# Patient Record
Sex: FEMALE | Race: WHITE | URBAN - METROPOLITAN AREA
[De-identification: names, ages, dates, MRNs, and addresses within clinical notes are randomized per-mention and may not be internally consistent; named-entity substitution may affect disease eponyms.]

---

## 2024-05-28 LAB
CYTOLOGY CVX/VAG DOC THIN PREP: NORMAL
HPV16+18+45 E6+E7MRNA CVX NAA+PROBE: NEGATIVE

## 2024-07-03 ENCOUNTER — CLINICAL ABSTRACT (OUTPATIENT)
Dept: CARE COORDINATION | Age: 39
End: 2024-07-03

## 2024-07-29 ENCOUNTER — OFFICE VISIT (OUTPATIENT)
Dept: OTOLARYNGOLOGY | Facility: CLINIC | Age: 39
End: 2024-07-29

## 2024-07-29 DIAGNOSIS — H92.01 RIGHT EAR PAIN: ICD-10-CM

## 2024-07-29 DIAGNOSIS — M26.609 TMJ (TEMPOROMANDIBULAR JOINT DISORDER): Primary | ICD-10-CM

## 2024-07-29 PROCEDURE — 92504 EAR MICROSCOPY EXAMINATION: CPT | Performed by: STUDENT IN AN ORGANIZED HEALTH CARE EDUCATION/TRAINING PROGRAM

## 2024-07-29 PROCEDURE — 92567 TYMPANOMETRY: CPT | Performed by: STUDENT IN AN ORGANIZED HEALTH CARE EDUCATION/TRAINING PROGRAM

## 2024-07-29 PROCEDURE — 99203 OFFICE O/P NEW LOW 30 MIN: CPT | Performed by: STUDENT IN AN ORGANIZED HEALTH CARE EDUCATION/TRAINING PROGRAM

## 2024-07-29 NOTE — PROGRESS NOTES
Vanda Soto is a 39 year old female.   Chief Complaint   Patient presents with    Ear Problem     Patient is here due to bilateral ear infections. Reports improvement in symptoms. Reports some pain in right ear.      HPI:   39-year-old presents in follow-up regarding her right ear.  She says she was previously treated with oral antibiotics and otic drops for right otitis media and says for the most part she is feeling better although at the time she is also reporting right jaw pain    No current outpatient medications on file.      Past Medical History:    Hypothyroidism      Social History:  Social History     Socioeconomic History    Marital status: Single   Tobacco Use    Smoking status: Never    Smokeless tobacco: Never   Vaping Use    Vaping status: Some Days    Devices: Disposable   Substance and Sexual Activity    Alcohol use: Yes    Drug use: Never      Past Surgical History:   Procedure Laterality Date    Hernia surgery           EXAM:   There were no vitals taken for this visit.    System Details   Skin Inspection - Normal.   Constitutional Overall appearance - Normal.   Head/Face Symmetric, TMJ tenderness not present    Eyes EOMI, PERRL   Right ear:  Canal clear, TM intact, no MONSTER   Left ear:  Canal clear, TM intact, no MONSTER   Nose: Septum midline, inferior turbinates not enlarged, nasal valves without collapse    Oral cavity/Oropharynx: No lesions or masses on inspection or palpation, tonsils symmetric    Neck: Soft without LAD, thyroid not enlarged  Voice clear/ no stridor   Other:      SCOPES AND PROCEDURES:         AUDIOGRAM AND IMAGING:     Type a tympanograms in each side    IMPRESSION:   1. TMJ (temporomandibular joint disorder)    2. Right ear pain  - Audiology Exam       Recommendations:  -Normal ear exam and tympanograms indicating normal eustachian tube function today.  -Given her TMJ pain during the episode it could be that she had a flare of a TMD process in the setting of bruxism at night    -If this returns discussed using anti-inflammatories and possibly discussing with her dentist    The patient indicates understanding of these issues and agrees to the plan.      Ignacio Ochoa MD  7/29/2024  4:24 PM